# Patient Record
Sex: MALE | Race: WHITE | Employment: UNEMPLOYED | URBAN - METROPOLITAN AREA
[De-identification: names, ages, dates, MRNs, and addresses within clinical notes are randomized per-mention and may not be internally consistent; named-entity substitution may affect disease eponyms.]

---

## 2017-04-27 ENCOUNTER — OP HISTORICAL/CONVERTED ENCOUNTER (OUTPATIENT)
Dept: OTHER | Age: 42
End: 2017-04-27

## 2017-12-02 ENCOUNTER — ED HISTORICAL/CONVERTED ENCOUNTER (OUTPATIENT)
Dept: OTHER | Age: 42
End: 2017-12-02

## 2018-09-19 ENCOUNTER — HOSPITAL ENCOUNTER (OUTPATIENT)
Dept: INTERVENTIONAL RADIOLOGY/VASCULAR | Age: 43
Discharge: HOME | End: 2018-09-19
Attending: ORTHOPAEDIC SURGERY | Admitting: ORTHOPAEDIC SURGERY
Payer: COMMERCIAL

## 2018-09-19 VITALS
HEIGHT: 74 IN | DIASTOLIC BLOOD PRESSURE: 64 MMHG | HEART RATE: 65 BPM | WEIGHT: 263 LBS | RESPIRATION RATE: 16 BRPM | TEMPERATURE: 98.1 F | SYSTOLIC BLOOD PRESSURE: 122 MMHG | OXYGEN SATURATION: 95 % | BODY MASS INDEX: 33.75 KG/M2

## 2018-09-19 DIAGNOSIS — M48.061 STENOSIS, SPINAL, LUMBAR: ICD-10-CM

## 2018-09-19 DIAGNOSIS — Z98.1 S/P SPINAL FUSION: ICD-10-CM

## 2018-09-19 DIAGNOSIS — M54.50 LOW BACK PAIN: ICD-10-CM

## 2018-09-19 PROCEDURE — 64483 NJX AA&/STRD TFRM EPI L/S 1: CPT

## 2018-09-19 PROCEDURE — 74011250636 HC RX REV CODE- 250/636: Performed by: STUDENT IN AN ORGANIZED HEALTH CARE EDUCATION/TRAINING PROGRAM

## 2018-09-19 PROCEDURE — 77030003666 HC NDL SPINAL BD -A

## 2018-09-19 PROCEDURE — 74011636320 HC RX REV CODE- 636/320: Performed by: STUDENT IN AN ORGANIZED HEALTH CARE EDUCATION/TRAINING PROGRAM

## 2018-09-19 RX ORDER — TRAMADOL HYDROCHLORIDE 50 MG/1
50 TABLET ORAL
COMMUNITY
End: 2020-10-21

## 2018-09-19 RX ORDER — DEXAMETHASONE SODIUM PHOSPHATE 4 MG/ML
8 INJECTION, SOLUTION INTRA-ARTICULAR; INTRALESIONAL; INTRAMUSCULAR; INTRAVENOUS; SOFT TISSUE ONCE
Status: COMPLETED | OUTPATIENT
Start: 2018-09-19 | End: 2018-09-19

## 2018-09-19 RX ORDER — CYCLOBENZAPRINE HCL 10 MG
10 TABLET ORAL
COMMUNITY
End: 2020-10-21

## 2018-09-19 RX ORDER — LIDOCAINE HYDROCHLORIDE 10 MG/ML
10 INJECTION, SOLUTION EPIDURAL; INFILTRATION; INTRACAUDAL; PERINEURAL
Status: COMPLETED | OUTPATIENT
Start: 2018-09-19 | End: 2018-09-19

## 2018-09-19 RX ADMIN — IOHEXOL 8 ML: 180 INJECTION INTRAVENOUS at 14:13

## 2018-09-19 RX ADMIN — DEXAMETHASONE SODIUM PHOSPHATE 8 MG: 4 INJECTION, SOLUTION INTRAMUSCULAR; INTRAVENOUS at 14:13

## 2018-09-19 RX ADMIN — LIDOCAINE HYDROCHLORIDE 8 ML: 10 INJECTION, SOLUTION EPIDURAL; INFILTRATION; INTRACAUDAL; PERINEURAL at 14:12

## 2018-09-19 NOTE — DISCHARGE INSTRUCTIONS
Steroid Injection Discharge Instructions    General Information:   A steroid injection was performed today, placing a combination of a steroid and an anesthetic (numbing medicine) into the space around the nerves of your spine. This is done to treat back pain. It may take 7-10 days for the injection to reach its full potential.  This procedure can be done at any level of the spinal column, depending on where your pain is. Your doctor will have ordered the appropriate level to be treated prior to your coming in for the procedure. Home Care Instructions: You can resume your regular diet. Do not drink alcohol today. You may notice that you have to use your pain medications less after your injection. Some people do not notice much of a change in their pain after the first injection. If that is the case, it is worth your time to have a second one done. This is why these injections are sometimes ordered in a series of three. Keep the puncture site clean and dry for 24 hours, and then you may remove the dressing. Showering is acceptable after the bandage is removed. Rest today be aware there maybe numbness or tingling that may last up to 12 hours after the inject. Call If:   You should call your Physician and/or the Radiology Nurse if you have any bleeding other than a small spot on your bandage. Call if you have any signs of infection, fever, increased pain at the puncture site, confusion, or a headache that worsens when you stand and eases when lying flat. Follow-Up Instructions:  Please see your ordering doctor as he/she has requested. Let your doctor know if you have relief from your pain so they may schedule another injection for you if it is indicated. To Reach Us:  Should you experience any significant changes, please call [phone] between the hours of 7:30 am and 10 pm or [phone] after hours.  After hours, ask the  to page the X-ray Technologist, and describe the problem to the technologist.           Date: 9/19/2018  Discharging Nurse:  Matias Chau RN

## 2018-10-29 ENCOUNTER — OP HISTORICAL/CONVERTED ENCOUNTER (OUTPATIENT)
Dept: OTHER | Age: 43
End: 2018-10-29

## 2018-11-14 ENCOUNTER — OP HISTORICAL/CONVERTED ENCOUNTER (OUTPATIENT)
Dept: OTHER | Age: 43
End: 2018-11-14

## 2019-01-18 ENCOUNTER — HOSPITAL ENCOUNTER (OUTPATIENT)
Dept: INTERVENTIONAL RADIOLOGY/VASCULAR | Age: 44
Discharge: HOME | End: 2019-01-18
Attending: ORTHOPAEDIC SURGERY | Admitting: RADIOLOGY
Payer: COMMERCIAL

## 2019-01-18 DIAGNOSIS — M54.50 LOW BACK PAIN: ICD-10-CM

## 2019-01-18 DIAGNOSIS — Z98.1 H/O SPINAL FUSION: ICD-10-CM

## 2019-01-18 DIAGNOSIS — M48.061 LUMBAR STENOSIS: ICD-10-CM

## 2019-01-18 PROCEDURE — 74011636320 HC RX REV CODE- 636/320: Performed by: RADIOLOGY

## 2019-01-18 PROCEDURE — 64483 NJX AA&/STRD TFRM EPI L/S 1: CPT

## 2019-01-18 PROCEDURE — 74011250636 HC RX REV CODE- 250/636: Performed by: RADIOLOGY

## 2019-01-18 RX ORDER — LIDOCAINE HYDROCHLORIDE 10 MG/ML
5 INJECTION, SOLUTION EPIDURAL; INFILTRATION; INTRACAUDAL; PERINEURAL ONCE
Status: COMPLETED | OUTPATIENT
Start: 2019-01-18 | End: 2019-01-18

## 2019-01-18 RX ORDER — DEXAMETHASONE SODIUM PHOSPHATE 10 MG/ML
10 INJECTION INTRAMUSCULAR; INTRAVENOUS ONCE
Status: COMPLETED | OUTPATIENT
Start: 2019-01-18 | End: 2019-01-18

## 2019-01-18 RX ADMIN — IOHEXOL 10 ML: 180 INJECTION INTRAVENOUS at 08:39

## 2019-01-18 RX ADMIN — DEXAMETHASONE SODIUM PHOSPHATE 10 MG: 10 INJECTION, SOLUTION INTRAMUSCULAR; INTRAVENOUS at 08:39

## 2019-01-18 RX ADMIN — LIDOCAINE HYDROCHLORIDE 5 ML: 10 INJECTION, SOLUTION EPIDURAL; INFILTRATION; INTRACAUDAL; PERINEURAL at 08:40

## 2019-01-18 NOTE — PROGRESS NOTES
Pt discharged via wheelchair escorted by wife, discharge instructions given both verbalize understanding

## 2019-01-18 NOTE — DISCHARGE INSTRUCTIONS
Steroid Injection Discharge Instructions    General Information:   A steroid injection was performed today, placing a combination of a steroid and an anesthetic (numbing medicine) into the space around the nerves of your spine. This is done to treat back pain. It may take 7-10 days for the injection to reach its full potential.  This procedure can be done at any level of the spinal column, depending on where your pain is. Your doctor will have ordered the appropriate level to be treated prior to your coming in for the procedure. Home Care Instructions: You can resume your regular diet. Do not drink alcohol. You may notice that you have to use your pain medications less after your injection. Some people do not notice much of a change in their pain after the                     first injection. If that is the case, it is worth your time to have a second one done. This is why these injections are sometimes ordered in a series of                    three. Keep the puncture site clean and dry for 24 hours, and then you may remove the dressing. Showering is acceptable after the bandage is removed. Avoid tub baths, swimming, and hot tubs until puncture site is healed. Call If:   You should call your Physician and/or the Radiology Nurse if you have any bleeding other than a small spot on your bandage. Call if you have any signs of infection, fever, increased pain at the puncture site, confusion, or a headache that worsens when you stand and eases when lying flat. Follow-Up Instructions:  Please see your ordering doctor as he/she has requested. Let your doctor know if you have relief from your pain so they may schedule another injection for you if it is indicated. To Reach Us:    Should you experience any significant changes, please call [phone] between the hours of 730 am and 10 pm or [phone] after hours.   After hours, ask the  to page the [address] Technologist, and describe the problem to the technologist.

## 2019-06-16 ENCOUNTER — ED HISTORICAL/CONVERTED ENCOUNTER (OUTPATIENT)
Dept: OTHER | Age: 44
End: 2019-06-16

## 2019-08-07 ENCOUNTER — OP HISTORICAL/CONVERTED ENCOUNTER (OUTPATIENT)
Dept: OTHER | Age: 44
End: 2019-08-07

## 2019-08-22 ENCOUNTER — OP HISTORICAL/CONVERTED ENCOUNTER (OUTPATIENT)
Dept: OTHER | Age: 44
End: 2019-08-22

## 2019-08-28 ENCOUNTER — OP HISTORICAL/CONVERTED ENCOUNTER (OUTPATIENT)
Dept: OTHER | Age: 44
End: 2019-08-28

## 2020-10-21 ENCOUNTER — HOSPITAL ENCOUNTER (EMERGENCY)
Age: 45
Discharge: HOME | End: 2020-10-21
Attending: FAMILY MEDICINE
Payer: COMMERCIAL

## 2020-10-21 VITALS
TEMPERATURE: 98.5 F | OXYGEN SATURATION: 97 % | SYSTOLIC BLOOD PRESSURE: 111 MMHG | WEIGHT: 275 LBS | RESPIRATION RATE: 16 BRPM | BODY MASS INDEX: 34.19 KG/M2 | DIASTOLIC BLOOD PRESSURE: 70 MMHG | HEIGHT: 75 IN | HEART RATE: 69 BPM

## 2020-10-21 DIAGNOSIS — S21.111A LACERATION OF CHEST WALL, RIGHT, INITIAL ENCOUNTER: Primary | ICD-10-CM

## 2020-10-21 PROCEDURE — 96372 THER/PROPH/DIAG INJ SC/IM: CPT

## 2020-10-21 PROCEDURE — 74011000250 HC RX REV CODE- 250: Performed by: FAMILY MEDICINE

## 2020-10-21 PROCEDURE — 90715 TDAP VACCINE 7 YRS/> IM: CPT | Performed by: FAMILY MEDICINE

## 2020-10-21 PROCEDURE — 90471 IMMUNIZATION ADMIN: CPT

## 2020-10-21 PROCEDURE — 75810000293 HC SIMP/SUPERF WND  RPR

## 2020-10-21 PROCEDURE — 99282 EMERGENCY DEPT VISIT SF MDM: CPT

## 2020-10-21 PROCEDURE — 74011250636 HC RX REV CODE- 250/636: Performed by: FAMILY MEDICINE

## 2020-10-21 RX ORDER — LIDOCAINE HYDROCHLORIDE AND EPINEPHRINE 10; 10 MG/ML; UG/ML
10 INJECTION, SOLUTION INFILTRATION; PERINEURAL ONCE
Status: COMPLETED | OUTPATIENT
Start: 2020-10-21 | End: 2020-10-21

## 2020-10-21 RX ADMIN — LIDOCAINE HYDROCHLORIDE AND EPINEPHRINE 100 MG: 10; 10 INJECTION, SOLUTION INFILTRATION; PERINEURAL at 12:32

## 2020-10-21 RX ADMIN — TETANUS TOXOID, REDUCED DIPHTHERIA TOXOID AND ACELLULAR PERTUSSIS VACCINE, ADSORBED 0.5 ML: 5; 2.5; 8; 8; 2.5 SUSPENSION INTRAMUSCULAR at 12:30

## 2020-10-21 NOTE — ED TRIAGE NOTES
Pt was cutting pipe and saw kicked up and hit him in upper right chest.  2 cm laceration noted. Bleeding controlled. Needs tetanes vaccine.

## 2020-10-21 NOTE — ED PROVIDER NOTES
[facility] EMERGENCY CARE CENTER    HISTORY AND PHYSICAL EXAM      Date: 10/21/2020  Patient Name: Agnes Lowery  Room:  D01/01    History of Presenting Illness     Chief Complaint:  Laceration       History Provided By: Patient  HPI/ROS Limits: None    HPI: Елена MerrittTammy Rebolledo is a 41 y.o. male presenting to the ED with CC of Laceration   with initial onset just PTA. Patient states he was sawing a pipe with a pipe saw at home when he lost control and it hit his upper right chest.  Patient complaining of dull achy pain and rates it as mild to moderate. Patient states he controlled the bleeding at home and is concerned about possible foreign body secondary to the nature of the pipe saw. Currently, patient denies severe pain, paralysis, or paresthesias. There are no other complaints, changes, or physical findings at this time. PCP: Kavya Ashford MD    No current facility-administered medications on file prior to encounter. Current Outpatient Medications on File Prior to Encounter   Medication Sig Dispense Refill    [DISCONTINUED] traMADol (ULTRAM) 50 mg tablet Take 50 mg by mouth every six (6) hours as needed for Pain.  [DISCONTINUED] cyclobenzaprine (FLEXERIL) 10 mg tablet Take 10 mg by mouth three (3) times daily as needed for Muscle Spasm(s).  rivaroxaban (XARELTO) 20 mg tab tablet Take 1 Tab by mouth daily. 14 Tab 0    oxyCODONE IR (OXY-IR) 15 mg immediate release tablet Take 1 Tab by mouth every four (4) hours as needed for Pain. Max Daily Amount: 90 mg. 60 Tab 0    [DISCONTINUED] diazepam (VALIUM) 5 mg tablet Take 1 Tab by mouth every six (6) hours as needed (Muscle Spasms). Max Daily Amount: 20 mg. 40 Tab 0    folic acid (FOLVITE) 1 mg tablet Take 1 mg by mouth daily.  [DISCONTINUED] citalopram (CELEXA) 40 mg tablet Take 40 mg by mouth every morning.  [DISCONTINUED] gabapentin (NEURONTIN) 800 mg tablet Take 800 mg by mouth three (3) times daily.       [DISCONTINUED] busPIRone (BUSPAR) 10 mg tablet Take 10 mg by mouth three (3) times daily. Past History     PAST MEDICAL  The patient  has a past medical history of Chronic pain, Coagulation disorder ([address] Ill-defined condition, Lumbar stenosis, and Thromboembolus ([facility] [address] (2014). PAST SURGICAL  The patient  has a past surgical history that includes ir inj foramin epid lumb anes/ster sngl (1/18/2019). SOCIAL HISTORY  The patient  reports that he has been smoking. He has never used smokeless tobacco. He reports that he does not drink alcohol. Allergies: The patient is allergic to ancef [cefazolin]; bee sting [sting, bee]; and penicillins. Review of Systems     Review of Systems   Constitutional: Negative for chills and fever. HENT: Negative. Eyes: Negative for visual disturbance. Respiratory: Negative for cough and shortness of breath. Cardiovascular: Negative for chest pain. Gastrointestinal: Negative for abdominal pain, diarrhea, nausea and vomiting. Endocrine: Negative. Genitourinary: Negative for discharge, dysuria and penile pain. Musculoskeletal: Negative. Skin: Positive for wound. Allergic/Immunologic: Negative. Neurological: Negative. Hematological: Negative. Psychiatric/Behavioral: Negative. Physical Exam     Vital Signs-Reviewed the patient's vital signs. Patient Vitals for the past 24 hrs:   Temp Pulse Resp BP SpO2   10/21/20 1049 98.5 °F (36.9 °C) 69 16 111/70 97 %      Physical Exam  Vitals signs and nursing note reviewed. Constitutional:       Appearance: Normal appearance. HENT:      Head: Normocephalic and atraumatic. Nose: Nose normal.      Mouth/Throat:      Lips: Pink. Mouth: Mucous membranes are moist.   Eyes:      Extraocular Movements: Extraocular movements intact. Conjunctiva/sclera: Conjunctivae normal.      Pupils: Pupils are equal, round, and reactive to light.    Neck:      Musculoskeletal: Normal range of motion and neck supple. Cardiovascular:      Pulses: Normal pulses. Pulmonary:      Effort: Pulmonary effort is normal. No respiratory distress. Musculoskeletal: Normal range of motion. Skin:     General: Skin is warm and dry. Neurological:      General: No focal deficit present. Mental Status: He is alert and oriented to person, place, and time. Psychiatric:         Mood and Affect: Mood normal.         Behavior: Behavior normal.       Diagnostic Study Results     Labs -   No results found for this or any previous visit (from the past 12 hour(s)). Radiologic Studies -   No orders to display     CT Results  (Last 48 hours)    None        CXR Results  (Last 48 hours)    None          Procedures/Critical Care     PROCEDURES    LACERATION REPAIR   By Memo Garduno MD    Location: Right upper chest  Length: 3.5 x 1 cm  Layers: single}  Contamination: mildly contaminated minimal debris  Suture(s):  3-0 ethilon 3 H-mat    Verbal consent. Patient identified and location of wound verified. Tetanus given. Appropriate sterile precautions observed with saline, drape, & gloves  Lidocaine 1% with epi given locally. Wound and surrounding structures checked and no further trauma found. Patient tolerated procedure well with no apparent complications. Medical Decision Making   I am the first provider for this patient. I reviewed the vital signs, available nursing notes, past medical history, past surgical history, family history and social history. Records Reviewed: Nursing Notes    ED Course:   Initial assessment performed. The patients presenting problems have been discussed, and they are in agreement with the care plan formulated and outlined with them. I have encouraged them to ask questions as they arise throughout their visit.          Medications   diph,Pertuss(AC),Tet Vac-PF (BOOSTRIX) suspension 0.5 mL (0.5 mL IntraMUSCular Given 10/21/20 1230)   lidocaine-EPINEPHrine (XYLOCAINE) 1 %-1:100,000 injection 100 mg (100 mg SubCUTAneous Given 10/21/20 1232)       Provider Notes (Medical Decision Making):   Patient presents to the ED with laceration from a pipe saw. Evaluation of the patient revealed laceration site wiring repair. Patient advised to return the ED for suture removal in 7 to 10 days. The patient has received maximum benefit from this visit and felt eligible for discharge. All questions were answered and concerns addressed. The patient was advised to follow up with PCP and/or return to the emergency department if condition worsens. The patient was discharged in stable condition. Diagnosis/Plan/Follow Up     CLINICAL IMPRESSION:      ICD-10-CM ICD-9-CM   1. Laceration of chest wall, right, initial encounter  S21.111A 875.0        DISPOSITION:  Discharged to Home/Self Care in stable condition. PLAN/FOLLOW UP  1. Current Discharge Medication List        2. The patients results have been reviewed with them. The patient has been counseled regarding their diagnosis. The patient verbally conveys understanding and agreement of the signs, symptoms, diagnosis, treatment and prognosis and additionally agrees to follow up as recommended with their PCP. The patient also agrees with the care-plan and conveys that all of their questions have been answered. I have also put together some discharge instructions for them that include: 1) educational information regarding their diagnosis, 2) how to care for their diagnosis at home, as well as a 3) list of reasons why they would want to return to the ED prior to their follow-up appointment, should their condition change.     Follow-up Information     Follow up With Specialties Details Why Contact Info    Jer Byers MD Internal Medicine Schedule an appointment as soon as possible for a visit  As needed [address] [place] [address]       [address] [facility] Emergency Medicine Go in 7 days For suture removal [address] [place] [address] [phone]        Return to ED if worse       ERNESTO Jimenez M.D.   [facility]  Emergency Department Physician

## 2020-10-21 NOTE — LETTER
[address] [place] [address] 
[phone] Work/School Note Date: 10/21/2020 To Whom It May concern: 
 
Karthik Coleman. Joel Holley was seen and treated today in the emergency room by the following provider(s): 
Attending Provider: Valente Duval MD. Karthik Cloeman. Joel Holley may return to work on October 23, 2020. Sincerely, 
 
 
Dr. Hardy Fish.  Halie Brower

## 2022-01-25 ENCOUNTER — OFFICE VISIT (OUTPATIENT)
Dept: ORTHOPEDIC SURGERY | Age: 47
End: 2022-01-25
Payer: COMMERCIAL

## 2022-01-25 VITALS — BODY MASS INDEX: 33.57 KG/M2 | WEIGHT: 270 LBS | HEIGHT: 75 IN

## 2022-01-25 DIAGNOSIS — M47.816 LUMBAR SPONDYLOSIS: ICD-10-CM

## 2022-01-25 DIAGNOSIS — M54.16 LUMBAR RADICULOPATHY: ICD-10-CM

## 2022-01-25 DIAGNOSIS — Z98.1 HISTORY OF LUMBAR SPINAL FUSION: ICD-10-CM

## 2022-01-25 DIAGNOSIS — M50.30 DDD (DEGENERATIVE DISC DISEASE), CERVICAL: ICD-10-CM

## 2022-01-25 DIAGNOSIS — M48.062 SPINAL STENOSIS OF LUMBAR REGION WITH NEUROGENIC CLAUDICATION: ICD-10-CM

## 2022-01-25 DIAGNOSIS — M54.50 LUMBAR BACK PAIN: Primary | ICD-10-CM

## 2022-01-25 PROCEDURE — 99214 OFFICE O/P EST MOD 30 MIN: CPT | Performed by: PHYSICIAN ASSISTANT

## 2022-01-25 RX ORDER — HYDROCODONE BITARTRATE AND ACETAMINOPHEN 5; 325 MG/1; MG/1
1 TABLET ORAL
Qty: 30 TABLET | Refills: 0 | Status: SHIPPED | OUTPATIENT
Start: 2022-01-25 | End: 2022-01-25 | Stop reason: ALTCHOICE

## 2022-01-25 RX ORDER — HYDROCODONE BITARTRATE AND ACETAMINOPHEN 5; 325 MG/1; MG/1
1 TABLET ORAL
Qty: 30 TABLET | Refills: 0 | Status: SHIPPED | OUTPATIENT
Start: 2022-01-25 | End: 2022-02-01

## 2022-01-25 RX ORDER — GABAPENTIN 400 MG/1
400 CAPSULE ORAL 3 TIMES DAILY
COMMUNITY
Start: 2021-12-30

## 2022-01-25 RX ORDER — METHOCARBAMOL 500 MG/1
TABLET, FILM COATED ORAL
COMMUNITY
Start: 2021-12-30

## 2022-01-25 NOTE — PROGRESS NOTES
Georgie Henderson (: 1975) is a 40 y.o. male, established patient, here for evaluation of the following chief complaint(s):  Back Pain       SUBJECTIVE/OBJECTIVE:  Georgie Henderson (: 1975) is a 40 y.o. male. Lumbar Laminectomy L4-s1, Posterior Spinal Fusion L5-s1 2016     Patient presents for evaluation of lumbar back pain with radiation to the right lower extremity which has been present for the past 2-3 years patient has history of lumbar laminectomy and fusion L5-S1 performed with Dr. Rita Resendiz 2016. Patient states initially did well following surgery however upon returning to work as a  he states he began developing pain in the lower lumbar region with radiation to the right buttock, thigh, groin and lower leg patient has been taking gabapentin 1200 mg daily, using heat and Tylenol as needed for pain relief and rates his average daily pain level as a 9/10. Patient states that by the end of the day he has difficulty even standing upright and walking. Patient describes right leg pain cramping in the thigh and buttock patient describes weakness in the right leg. Patient has a history of DVT/PE and takes Xarelto chronically. Patient states he has progressed to the point that he can no longer continue to function normally experiencing severe pain. The patient denies bowel/bladder incontinence    Allergies   Allergen Reactions    Ancef [Cefazolin] Anaphylaxis    Bee Sting [Sting, Bee] Anaphylaxis    Penicillins Anaphylaxis       Current Outpatient Medications   Medication Sig    HYDROcodone-acetaminophen (NORCO) 5-325 mg per tablet Take 1 Tablet by mouth every six (6) hours as needed for Pain for up to 7 days. Max Daily Amount: 4 Tablets.  gabapentin (NEURONTIN) 400 mg capsule Take 400 mg by mouth three (3) times daily.     methocarbamoL (ROBAXIN) 500 mg tablet take 1 tablet by mouth twice a day if needed for muscle spasm    rivaroxaban (XARELTO) 20 mg tab tablet Take 1 Tab by mouth daily.  folic acid (FOLVITE) 1 mg tablet Take 1 mg by mouth daily. No current facility-administered medications for this visit. Social History     Socioeconomic History    Marital status:      Spouse name: Not on file    Number of children: Not on file    Years of education: Not on file    Highest education level: Not on file   Occupational History    Not on file   Tobacco Use    Smoking status: Current Some Day Smoker    Smokeless tobacco: Never Used   Substance and Sexual Activity    Alcohol use: No    Drug use: Not on file    Sexual activity: Yes     Partners: Female   Other Topics Concern    Not on file   Social History Narrative    Not on file     Social Determinants of Health     Financial Resource Strain:     Difficulty of Paying Living Expenses: Not on file   Food Insecurity:     Worried About Running Out of Food in the Last Year: Not on file    King of Food in the Last Year: Not on file   Transportation Needs:     Lack of Transportation (Medical): Not on file    Lack of Transportation (Non-Medical):  Not on file   Physical Activity:     Days of Exercise per Week: Not on file    Minutes of Exercise per Session: Not on file   Stress:     Feeling of Stress : Not on file   Social Connections:     Frequency of Communication with Friends and Family: Not on file    Frequency of Social Gatherings with Friends and Family: Not on file    Attends Denominational Services: Not on file    Active Member of Clubs or Organizations: Not on file    Attends Club or Organization Meetings: Not on file    Marital Status: Not on file   Intimate Partner Violence:     Fear of Current or Ex-Partner: Not on file    Emotionally Abused: Not on file    Physically Abused: Not on file    Sexually Abused: Not on file   Housing Stability:     Unable to Pay for Housing in the Last Year: Not on file    Number of [place] in the Last Year: Not on file    Unstable Housing in the Last Year: Not on file       Past Surgical History:   Procedure Laterality Date    IR INJ [facility] EPID LUMB PURA/[facility]  1/18/2019       Family History   Problem Relation Age of Onset    Seizures Mother     Cancer Mother         LUNG    Diabetes Father     Seizures Brother     Depression Brother                REVIEW OF SYSTEMS:  ROS    Patient denies any recent fever, chills, nausea, vomiting, chest pain, or shortness of breath. Vitals:  Ht 6' 3\" (1.905 m)   Wt 122.5 kg (270 lb)   BMI 33.75 kg/m²    Body mass index is 33.75 kg/m². PHYSICAL EXAM:  Physical Exam     The patient is alert and oriented x3 and in no acute distress. Patient ambulates with a normal gait without gait aids. Sensory testing in all major nerve distributions in the lower extremities is intact and symmetric with the exception of left medial ankle foot, and calf which are diminished versus the right. . Manual motor testing of the major muscle groups of the lower extremities including dorsiflexion/EHL/plantarflexion, knee flexion/extension, hip flexion/extension/abduction/adduction is intact in the left lower extremity manual motor testing of the major muscle groups of the right lower extremity demonstrate right knee flexion/extension weakness 3/5 and right hip flexion/abduction weakness 3/5. Deep tendon reflexes flat and symmetric in the bilateral knees and ankles. Negative straight leg raise on the left and is considered positive on the right. No evidence of clonus bilaterally. Babinski's downgoing and symmetric bilaterally. Distal pulses 2+ and symmetric bilaterally. Patient was noted to have pain with range of motion of the right hip with flexion to 100 degrees, 20 degrees internal rotation and 30 degrees external rotation for range of motion of the left hip. There is no tenderness to palpation of the thoracic, lumbar or sacral regions.     IMAGING:    AP, lateral, flexion and extension digital view radiographs of the lumbar spine obtained in the office today and was reviewed demonstrate good alignment posterior instrumentation L5-S1 which appears to be well fused without evidence of hardware loosening or subsidence. There is a 20 degree dextroscoliosis deformity with the apex of curvature at the L2 vertebral level. The adjacent segment above the fusion level L4-5 demonstrates significant disc height loss with evidence of spondylosis with foraminal encroachment. At L3-L4 there is grade 1 retrolisthesis of 5 mm which is mobile on flexion/extension views. There is no evidence of fracture, lytic lesion or acute bony abnormality. Orders Placed This Encounter    XR SPINE LUMB MIN 4 V     2A     Standing Status:   Future     Number of Occurrences:   1     Standing Expiration Date:   1/26/2023    XR PELV 1 OR 2 V     2A     Standing Status:   Future     Number of Occurrences:   1     Standing Expiration Date:   1/25/2023    DISCONTD: HYDROcodone-acetaminophen (NORCO) 5-325 mg per tablet     Sig: Take 1 Tablet by mouth every six (6) hours as needed for Pain for up to 7 days. Max Daily Amount: 4 Tablets. Dispense:  30 Tablet     Refill:  0    DISCONTD: HYDROcodone-acetaminophen (NORCO) 5-325 mg per tablet     Sig: Take 1 Tablet by mouth every six (6) hours as needed for Pain for up to 8 days. Max Daily Amount: 4 Tablets. Indications: pain     Dispense:  30 Tablet     Refill:  0    HYDROcodone-acetaminophen (NORCO) 5-325 mg per tablet     Sig: Take 1 Tablet by mouth every six (6) hours as needed for Pain for up to 7 days. Max Daily Amount: 4 Tablets. Dispense:  30 Tablet     Refill:  0          ASSESSMENT/PLAN:      1. Lumbar back pain  -     XR SPINE LUMB MIN 4 V; Future  -     XR PELV 1 OR 2 V; Future  -     HYDROcodone-acetaminophen (NORCO) 5-325 mg per tablet; Take 1 Tablet by mouth every six (6) hours as needed for Pain for up to 7 days.  Max Daily Amount: 4 Tablets., Normal, Disp-30 Tablet, R-0  2. History of lumbar spinal fusion  3. Lumbar spondylosis  4. DDD (degenerative disc disease), cervical  5. Lumbar radiculopathy  6. Spinal stenosis of lumbar region with neurogenic claudication        Below is the assessment and plan developed based on review of pertinent history, physical exam, labs, studies, and medications. Have discussed the diagnosis and radiographic findings at length and answered all patient questions to his questions to their satisfaction. Given the patient's ongoing pain, motor and sensory deficits despite conservative management have referred the patient for MRI lumbar to assess for severe stenosis. We will plan on seeing the patient back for reevaluation and further treatment recommendations once imaging results are available for review. The patient understands and agrees to the treatment plan as outlined above. No follow-ups on file. Dr. Sabine Sandoval was available for immediate consultation as needed. An electronic signature was used to authenticate this note.   -- Bassam Morse PA-C

## 2022-02-28 DIAGNOSIS — M54.16 ACUTE LUMBAR RADICULOPATHY: Primary | ICD-10-CM

## 2022-02-28 DIAGNOSIS — M48.062 SPINAL STENOSIS OF LUMBAR REGION WITH NEUROGENIC CLAUDICATION: ICD-10-CM

## 2022-02-28 DIAGNOSIS — M54.16 ACUTE LUMBAR RADICULOPATHY: ICD-10-CM

## 2022-02-28 RX ORDER — HYDROCODONE BITARTRATE AND ACETAMINOPHEN 5; 325 MG/1; MG/1
1 TABLET ORAL
Qty: 30 TABLET | Refills: 0 | OUTPATIENT
Start: 2022-02-28 | End: 2022-02-28 | Stop reason: SDUPTHER

## 2022-02-28 RX ORDER — HYDROCODONE BITARTRATE AND ACETAMINOPHEN 5; 325 MG/1; MG/1
1 TABLET ORAL
Qty: 30 TABLET | Refills: 0 | Status: SHIPPED | OUTPATIENT
Start: 2022-02-28 | End: 2022-03-07

## 2022-02-28 NOTE — TELEPHONE ENCOUNTER
Patient calling about next step and refill on pain meds. States he was supposed to have a pain mgmt referral for injections? ?    Please clarify.     [phone]

## 2022-03-07 DIAGNOSIS — M54.16 ACUTE LUMBAR RADICULOPATHY: ICD-10-CM

## 2022-03-07 DIAGNOSIS — M48.062 SPINAL STENOSIS OF LUMBAR REGION WITH NEUROGENIC CLAUDICATION: ICD-10-CM

## 2022-03-28 DIAGNOSIS — M48.062 SPINAL STENOSIS OF LUMBAR REGION WITH NEUROGENIC CLAUDICATION: Primary | ICD-10-CM

## 2022-03-28 RX ORDER — HYDROCODONE BITARTRATE AND ACETAMINOPHEN 5; 325 MG/1; MG/1
1 TABLET ORAL
Qty: 15 TABLET | Refills: 0 | Status: SHIPPED | OUTPATIENT
Start: 2022-03-28 | End: 2022-04-01

## 2022-03-28 NOTE — TELEPHONE ENCOUNTER
Patients wife called requesting a refill on Hydrocodone/Acetameniphin 5/325    Wife also reported no one had called to schedule for MRI that was ordered 2/28/22, I have Eli the number to [address] so that she can call and have MRI scheduled

## 2022-04-04 DIAGNOSIS — M48.062 SPINAL STENOSIS OF LUMBAR REGION WITH NEUROGENIC CLAUDICATION: ICD-10-CM

## 2022-04-04 DIAGNOSIS — M54.16 ACUTE LUMBAR RADICULOPATHY: ICD-10-CM

## 2022-04-08 ENCOUNTER — HOSPITAL ENCOUNTER (OUTPATIENT)
Dept: MRI IMAGING | Age: 47
Discharge: HOME | End: 2022-04-08
Payer: COMMERCIAL

## 2022-04-08 PROCEDURE — 72148 MRI LUMBAR SPINE W/O DYE: CPT

## 2022-04-28 ENCOUNTER — OFFICE VISIT (OUTPATIENT)
Dept: ORTHOPEDIC SURGERY | Age: 47
End: 2022-04-28
Payer: COMMERCIAL

## 2022-04-28 VITALS — WEIGHT: 270 LBS | HEIGHT: 75 IN | BODY MASS INDEX: 33.57 KG/M2

## 2022-04-28 DIAGNOSIS — Z98.1 HISTORY OF LUMBAR SPINAL FUSION: ICD-10-CM

## 2022-04-28 DIAGNOSIS — M51.26 PROTRUSION OF LUMBAR INTERVERTEBRAL DISC: ICD-10-CM

## 2022-04-28 DIAGNOSIS — M48.062 SPINAL STENOSIS OF LUMBAR REGION WITH NEUROGENIC CLAUDICATION: ICD-10-CM

## 2022-04-28 DIAGNOSIS — M54.50 LUMBAR BACK PAIN: Primary | ICD-10-CM

## 2022-04-28 DIAGNOSIS — M54.16 LUMBAR RADICULOPATHY: ICD-10-CM

## 2022-04-28 PROCEDURE — 99215 OFFICE O/P EST HI 40 MIN: CPT | Performed by: PHYSICIAN ASSISTANT

## 2022-04-28 RX ORDER — HYDROCODONE BITARTRATE AND ACETAMINOPHEN 5; 325 MG/1; MG/1
1 TABLET ORAL
Qty: 30 TABLET | Refills: 0 | Status: SHIPPED | OUTPATIENT
Start: 2022-04-28 | End: 2022-05-05

## 2022-04-28 NOTE — PROGRESS NOTES
Virginia NievesTammy Brown (: 1975) is a 40 y.o. male, established  patient, here for evaluation of the following chief complaint(s):  Back Pain and Leg Pain         SUBJECTIVE/OBJECTIVE:    Virginia Brown (: 1975) is a 40 y.o. male who presents for evaluation of lumbar back pain with radiation to the right lower extremity. Symptoms have been present for the past 2-3 years. The patient has a history of lumbar laminectomy and fusion L5-S1 performed with Dr. Richard Martin 2016. Patient states initially did well following that procedure. Patient states that recovering from the surgery returning to work as a  he began developing pain in the lower lumbar region with radiation to the right buttock, posterior lateral thigh and lower leg. The patient has been taking gabapentin 1200 mg daily, using heat and Tylenol as needed for pain relief and rates his average daily pain level as a 9/10. The patient states that by the end of the day he is difficulty standing upright and walking even short distances. Patient describes right leg cramping and weakness in the right leg. Patient has a history of DVT/PE and takes Xarelto chronically. Patient states even performing activities of daily living are difficult for him secondary to severe pain. On the basis of his last evaluation given failure to improve with conservative management increase occluding steroids, muscle relaxers, pain medication and several weeks of outpatient physical therapy the patient was referred for MRI and returns today for review. The patient denies saddle paraesthesias/ anaesthesia. Pain Assessment  2022   Location of Pain Back;Leg   Severity of Pain 9          ROS    The patient denies fevers, chills, chest pain, shortness of breath, nausea, vomiting. Positive for musculoskeletal issues as described in the HPI.       Vitals:  Ht 6' 3\" (1.905 m)   Wt 270 lb (122.5 kg)   BMI 33.75 kg/m²    Body mass index is 33.75 kg/m². PHYSICAL EXAM:    The patient is alert and oriented x3 and in no acute distress. Patient ambulates with a normal gait without gait aids. Sensory testing in all major nerve distributions in the lower extremities is intact and symmetric with the exception of the right medial foot and medial calf which are diminished versus the left. . Manual motor testing of the major muscle groups of the lower extremities including dorsiflexion/EHL/ plantarflexion, knee flexion/extension, hip flexion/extension/abduction/ adduction is intact and symmetric in the bilateral lower extremities with the exception of right dorsiflexion/eversion which is weak and graded 3/5, right knee flexion/extension is weak and graded 3/5, right hip flexion is weak and graded 4/5. Deep tendon reflexes flat and symmetric in the bilateral knees and ankles. Hip range of motion elicits low back pain. Negative straight leg raise bilaterally. No evidence of clonus bilaterally. Babinski's downgoing and symmetric bilaterally. Distal pulses intact and symmetric bilaterally. There is no tenderness to palpation of the thoracic, lumbar or sacral regions. IMAGING:    XR Results (most recent):  Results from Appointment encounter on 01/25/22    XR PELV 1 OR 2 V    Narrative  AP pelvis digital radiograph obtained in the office today was reviewed and demonstrate mild erosion of femoral acetabular joint space with no evidence of degenerative changes or acute bony abnormality. MRI Results (most recent):  Results from Orders Only encounter on 04/04/22    MRI LUMB SPINE WO CONT    Narrative  MRI LUMBAR SPINE WITHOUT INTRAVENOUS CONTRAST:    CLINICAL HISTORY:  Back pain radiating into the right leg. T1 and T2 weighted images were obtained in the axial and sagittal planes. At the L1-L2 level , narrowing of the disc space is noted. Mild bulging of the  disc is noted. Mild degenerative changes of the facet joints are noted. No  significant central or foraminal stenoses is noted. At the L2-L3 level, no disc herniation or central or foraminal stenoses is noted    At the L3-L4 level, desiccation and narrowing of the disc space is noted. Bulging of the disc and mild foraminal stenoses is noted bilaterally. Mild  degenerative changes of the facet joints are noted. No significant central  stenoses is noted. At the L4-L5 level,  narrowing of the disc space is noted. Broad-based disc  protrusion is noted within indentation of either L5 nerve in the lateral recess  cannot be excluded. Moderate foraminal stenoses is noted with compression of the  right L4 nerve within the right neuroforamen and indentation of the left L4  nerve within the neuroforamen. Changes of laminectomy with good decompression of  the central canal are noted. There are grade I degenerative changes of the  endplates (fibrovascular tissue)  . At the L5-S1 level,  there are changes of laminectomy and posterior fusion with  surgical screws through the L5 and S1 vertebral bodies. Central canal is well  decompressed. Moderate foraminal stenoses is noted bilaterally Grade II  degenerative disease of the endplates are noted (fatty transformation) . The conus medullaris  has a normal MR appearance. No acute or chronic fracture or spondylolisthesis was demonstrated. Impression  Degenerative disc disease L4-L5 with compression of the right L4  nerve within the neuroforamen and indentation of the left L4 nerve within the  neuroforamen. Broad-based disc protrusion and indentation of either L5 nerve in  the lateral recess/subarticular zone cannot be excluded.     Changes of posterior fusion L5-S1 with decompression of the central canal.  Indentation         Allergies   Allergen Reactions    Ancef [Cefazolin] Anaphylaxis    Bee Sting [Sting, Bee] Anaphylaxis    Penicillins Anaphylaxis         Current Outpatient Medications   Medication Sig    HYDROcodone-acetaminophen (NORCO) 5-325 mg per tablet Take 1 Tablet by mouth every four (4) hours as needed for Pain for up to 7 days. Max Daily Amount: 6 Tablets. Indications: pain    gabapentin (NEURONTIN) 400 mg capsule Take 400 mg by mouth three (3) times daily.  methocarbamoL (ROBAXIN) 500 mg tablet take 1 tablet by mouth twice a day if needed for muscle spasm    rivaroxaban (XARELTO) 20 mg tab tablet Take 1 Tab by mouth daily.  folic acid (FOLVITE) 1 mg tablet Take 1 mg by mouth daily. No current facility-administered medications for this visit. Past Medical History:   Diagnosis Date    Chronic pain     Coagulation disorder ([facility] [address]     FACTOR 5     Ill-defined condition     DEPRESSION    Lumbar stenosis     Thromboembolus ([facility] [address] 2014    L LEG AND LUNGS          Past Surgical History:   Procedure Laterality Date    IR INJ FORAMIN EPID LUMB ANES/STER SNGL  1/18/2019         Family History   Problem Relation Age of Onset    Seizures Mother     Cancer Mother         LUNG    Diabetes Father     Seizures Brother     Depression Brother           Social History     Tobacco Use    Smoking status: Current Some Day Smoker    Smokeless tobacco: Never Used   Substance Use Topics    Alcohol use: No    Drug use: Not on file                 ASSESSMENT/PLAN:      1. Lumbar back pain  -     REFERRAL TO SPINE INJECTION  2. Spinal stenosis of lumbar region with neurogenic claudication  -     HYDROcodone-acetaminophen (NORCO) 5-325 mg per tablet; Take 1 Tablet by mouth every four (4) hours as needed for Pain for up to 7 days. Max Daily Amount: 6 Tablets. Indications: pain, Normal, Disp-30 Tablet, R-0  -     REFERRAL TO SPINE INJECTION  3. Lumbar radiculopathy  -     REFERRAL TO SPINE INJECTION  4. Protrusion of lumbar intervertebral disc  -     REFERRAL TO SPINE INJECTION  5.  History of lumbar spinal fusion  -     REFERRAL TO SPINE INJECTION      Below is the assessment and plan developed based on review of pertinent history, physical exam, labs, studies, and medications. Have discussed the diagnosis and radiographic findings at length and answered all patient questions to his questions to their satisfaction. Given the patient's ongoing pain despite conservative management have referred the patient for transforaminal epidural steroid injection. With failure of 3 injections to find lasting pain relief have asked the patient to return to our office for reevaluation. The patient understands and agrees to the treatment plan as outlined above. Return if symptoms worsen or fail to improve. Dr. Keyanna Gilmore was available for immediate consult during this encounter. An electronic signature was used to authenticate this note.     -- Jignesh Espinoza PA-C

## 2023-08-10 ENCOUNTER — HOSPITAL ENCOUNTER (EMERGENCY)
Facility: HOSPITAL | Age: 48
Discharge: HOME | End: 2023-08-10
Payer: COMMERCIAL

## 2023-08-10 VITALS
RESPIRATION RATE: 16 BRPM | HEIGHT: 75 IN | HEART RATE: 59 BPM | SYSTOLIC BLOOD PRESSURE: 114 MMHG | BODY MASS INDEX: 35.19 KG/M2 | TEMPERATURE: 98.1 F | OXYGEN SATURATION: 97 % | WEIGHT: 283 LBS | DIASTOLIC BLOOD PRESSURE: 67 MMHG

## 2023-08-10 DIAGNOSIS — L03.90 CELLULITIS, UNSPECIFIED CELLULITIS SITE: ICD-10-CM

## 2023-08-10 DIAGNOSIS — B88.0 CHIGGER BITES: Primary | ICD-10-CM

## 2023-08-10 PROCEDURE — 99283 EMERGENCY DEPT VISIT LOW MDM: CPT

## 2023-08-10 RX ORDER — DOXYCYCLINE HYCLATE 100 MG/1
100 CAPSULE ORAL 2 TIMES DAILY
Qty: 14 CAPSULE | Refills: 0 | Status: SHIPPED | OUTPATIENT
Start: 2023-08-10 | End: 2023-08-17

## 2023-08-10 ASSESSMENT — LIFESTYLE VARIABLES
HOW MANY STANDARD DRINKS CONTAINING ALCOHOL DO YOU HAVE ON A TYPICAL DAY: PATIENT DOES NOT DRINK
HOW OFTEN DO YOU HAVE A DRINK CONTAINING ALCOHOL: NEVER

## 2023-08-10 ASSESSMENT — PAIN DESCRIPTION - LOCATION: LOCATION: LEG

## 2023-08-10 ASSESSMENT — PAIN DESCRIPTION - ORIENTATION: ORIENTATION: UPPER;LEFT

## 2023-08-10 ASSESSMENT — PAIN - FUNCTIONAL ASSESSMENT: PAIN_FUNCTIONAL_ASSESSMENT: 0-10

## 2023-08-10 ASSESSMENT — PAIN SCALES - GENERAL: PAINLEVEL_OUTOF10: 6

## 2023-08-10 NOTE — ED PROVIDER NOTES
[facility] EMERGENCY DEPT  EMERGENCY DEPARTMENT HISTORY AND PHYSICAL EXAM      Date: 8/10/2023  Patient Name: Moses Hodge  MRN: [id number]  YOB: 1975  Date of evaluation: 8/10/2023  Provider: Nate Sunshine PA-C   Note Started: 3:29 PM EDT 8/10/23    HISTORY OF PRESENT ILLNESS     Chief Complaint   Patient presents with    Insect Bite       History Provided By: Patient    HPI: Moses Level. Kennedy Hodge is a 56 y.o. male past medical history listed below presents emerged from today with complaint of insect bites starting 3 days ago. .  Him and his son both have similar bites and reports that it caused by chiggers. Reports severe itching to the area. No swelling. Discharge from 2 of the lesions yesterday. Comes in today with thinks might be infected. Denies any fever, chills, nausea, vomiting, diarrhea, chest pain, shortness of breath at this time. PAST MEDICAL HISTORY   Past Medical History:  Past Medical History:   Diagnosis Date    Chronic pain     Coagulation disorder ([address])     FACTOR 5     Ill-defined condition     DEPRESSION    Lumbar stenosis     Thromboembolus ([address]) 2014    L LEG AND LUNGS       Past Surgical History:  Past Surgical History:   Procedure Laterality Date    IR LUMBAR TRANSFORAMINAL EPIDURAL SINGLE  1/18/2019       Family History:  Family History   Problem Relation Age of Onset    Seizures Brother     Seizures Mother     Cancer Mother         LUNG    Diabetes Father     Depression Brother        Social History:  Social History     Tobacco Use    Smoking status: Some Days    Smokeless tobacco: Never   Substance Use Topics    Alcohol use: No       Allergies: Allergies   Allergen Reactions    Bee Venom Anaphylaxis    Cefazolin Anaphylaxis    Penicillins Anaphylaxis       PCP: Eligio Lombard, MD    Current Meds:   No current facility-administered medications for this encounter.      Current Outpatient Medications   Medication Sig Dispense Refill    doxycycline hyclate (VIBRAMYCIN) 100 MG

## 2023-08-10 NOTE — ED TRIAGE NOTES
Reports infected insect bites to left inner thigh and outer thigh. Redness and scabbing noted to said areas.

## 2025-03-06 ENCOUNTER — APPOINTMENT (OUTPATIENT)
Facility: HOSPITAL | Age: 50
End: 2025-03-06
Payer: COMMERCIAL

## 2025-03-06 ENCOUNTER — HOSPITAL ENCOUNTER (EMERGENCY)
Facility: HOSPITAL | Age: 50
Discharge: HOME | End: 2025-03-06
Attending: EMERGENCY MEDICINE
Payer: COMMERCIAL

## 2025-03-06 VITALS
DIASTOLIC BLOOD PRESSURE: 74 MMHG | TEMPERATURE: 98.2 F | BODY MASS INDEX: 28.6 KG/M2 | OXYGEN SATURATION: 96 % | HEART RATE: 68 BPM | HEIGHT: 75 IN | WEIGHT: 230 LBS | SYSTOLIC BLOOD PRESSURE: 142 MMHG | RESPIRATION RATE: 15 BRPM

## 2025-03-06 DIAGNOSIS — R07.9 CHEST PAIN, UNSPECIFIED TYPE: Primary | ICD-10-CM

## 2025-03-06 DIAGNOSIS — R11.2 NAUSEA AND VOMITING, UNSPECIFIED VOMITING TYPE: ICD-10-CM

## 2025-03-06 DIAGNOSIS — R10.13 ABDOMINAL PAIN, EPIGASTRIC: ICD-10-CM

## 2025-03-06 LAB
ALBUMIN SERPL-MCNC: 3.8 G/DL (ref 3.5–5)
ALBUMIN/GLOB SERPL: 1.3 (ref 1.1–2.2)
ALP SERPL-CCNC: 74 U/L (ref 45–117)
ALT SERPL-CCNC: 33 U/L (ref 12–78)
ANION GAP SERPL CALC-SCNC: 10 MMOL/L (ref 2–12)
AST SERPL W P-5'-P-CCNC: 23 U/L (ref 15–37)
BASOPHILS # BLD: 0.02 K/UL (ref 0–0.1)
BASOPHILS NFR BLD: 0.2 % (ref 0–1)
BILIRUB SERPL-MCNC: 0.7 MG/DL (ref 0.2–1)
BUN SERPL-MCNC: 12 MG/DL (ref 6–20)
BUN/CREAT SERPL: 11 (ref 12–20)
CA-I BLD-MCNC: 9.1 MG/DL (ref 8.5–10.1)
CHLORIDE SERPL-SCNC: 106 MMOL/L (ref 97–108)
CO2 SERPL-SCNC: 27 MMOL/L (ref 21–32)
CREAT SERPL-MCNC: 1.11 MG/DL (ref 0.7–1.3)
DIFFERENTIAL METHOD BLD: ABNORMAL
EOSINOPHIL # BLD: 0.1 K/UL (ref 0–0.4)
EOSINOPHIL NFR BLD: 0.9 % (ref 0–7)
ERYTHROCYTE [DISTWIDTH] IN BLOOD BY AUTOMATED COUNT: 12.6 % (ref 11.5–14.5)
FLUAV RNA SPEC QL NAA+PROBE: NOT DETECTED
FLUBV RNA SPEC QL NAA+PROBE: NOT DETECTED
GLOBULIN SER CALC-MCNC: 3 G/DL (ref 2–4)
GLUCOSE SERPL-MCNC: 96 MG/DL (ref 65–100)
HCT VFR BLD AUTO: 44.9 % (ref 36.6–50.3)
HGB BLD-MCNC: 15 G/DL (ref 12.1–17)
IMM GRANULOCYTES # BLD AUTO: 0.01 K/UL (ref 0–0.04)
IMM GRANULOCYTES NFR BLD AUTO: 0.1 % (ref 0–0.5)
LYMPHOCYTES # BLD: 1.48 K/UL (ref 0.8–3.5)
LYMPHOCYTES NFR BLD: 13.6 % (ref 12–49)
MCH RBC QN AUTO: 31.8 PG (ref 26–34)
MCHC RBC AUTO-ENTMCNC: 33.4 G/DL (ref 30–36.5)
MCV RBC AUTO: 95.3 FL (ref 80–99)
MONOCYTES # BLD: 0.71 K/UL (ref 0–1)
MONOCYTES NFR BLD: 6.5 % (ref 5–13)
NEUTS SEG # BLD: 8.59 K/UL (ref 1.8–8)
NEUTS SEG NFR BLD: 78.7 % (ref 32–75)
PLATELET # BLD AUTO: 206 K/UL (ref 150–400)
PMV BLD AUTO: 10.2 FL (ref 8.9–12.9)
POTASSIUM SERPL-SCNC: 4 MMOL/L (ref 3.5–5.1)
PROT SERPL-MCNC: 6.8 G/DL (ref 6.4–8.2)
RBC # BLD AUTO: 4.71 M/UL (ref 4.1–5.7)
SARS-COV-2 RNA RESP QL NAA+PROBE: NOT DETECTED
SODIUM SERPL-SCNC: 143 MMOL/L (ref 136–145)
TROPONIN I SERPL HS-MCNC: 5 NG/L (ref 0–76)
TROPONIN I SERPL HS-MCNC: 5 NG/L (ref 0–76)
WBC # BLD AUTO: 10.9 K/UL (ref 4.1–11.1)

## 2025-03-06 PROCEDURE — 2580000003 HC RX 258: Performed by: EMERGENCY MEDICINE

## 2025-03-06 PROCEDURE — 96374 THER/PROPH/DIAG INJ IV PUSH: CPT

## 2025-03-06 PROCEDURE — 2500000003 HC RX 250 WO HCPCS: Performed by: EMERGENCY MEDICINE

## 2025-03-06 PROCEDURE — 6370000000 HC RX 637 (ALT 250 FOR IP): Performed by: EMERGENCY MEDICINE

## 2025-03-06 PROCEDURE — 6360000002 HC RX W HCPCS: Performed by: EMERGENCY MEDICINE

## 2025-03-06 PROCEDURE — 71046 X-RAY EXAM CHEST 2 VIEWS: CPT

## 2025-03-06 PROCEDURE — 84484 ASSAY OF TROPONIN QUANT: CPT

## 2025-03-06 PROCEDURE — 96375 TX/PRO/DX INJ NEW DRUG ADDON: CPT

## 2025-03-06 PROCEDURE — 93005 ELECTROCARDIOGRAM TRACING: CPT | Performed by: EMERGENCY MEDICINE

## 2025-03-06 PROCEDURE — 99285 EMERGENCY DEPT VISIT HI MDM: CPT

## 2025-03-06 PROCEDURE — 80053 COMPREHEN METABOLIC PANEL: CPT

## 2025-03-06 PROCEDURE — 87636 SARSCOV2 & INF A&B AMP PRB: CPT

## 2025-03-06 PROCEDURE — 36415 COLL VENOUS BLD VENIPUNCTURE: CPT

## 2025-03-06 PROCEDURE — 85025 COMPLETE CBC W/AUTO DIFF WBC: CPT

## 2025-03-06 RX ORDER — KETOROLAC TROMETHAMINE 15 MG/ML
15 INJECTION, SOLUTION INTRAMUSCULAR; INTRAVENOUS
Status: COMPLETED | OUTPATIENT
Start: 2025-03-06 | End: 2025-03-06

## 2025-03-06 RX ORDER — KETOROLAC TROMETHAMINE 10 MG/1
10 TABLET, FILM COATED ORAL EVERY 6 HOURS PRN
Qty: 20 TABLET | Refills: 0 | Status: SHIPPED | OUTPATIENT
Start: 2025-03-06

## 2025-03-06 RX ORDER — LIDOCAINE HYDROCHLORIDE 20 MG/ML
15 SOLUTION OROPHARYNGEAL EVERY 4 HOURS PRN
Qty: 250 ML | Refills: 1 | Status: SHIPPED | OUTPATIENT
Start: 2025-03-06

## 2025-03-06 RX ORDER — ONDANSETRON 2 MG/ML
4 INJECTION INTRAMUSCULAR; INTRAVENOUS ONCE
Status: COMPLETED | OUTPATIENT
Start: 2025-03-06 | End: 2025-03-06

## 2025-03-06 RX ORDER — MAGNESIUM HYDROXIDE/ALUMINUM HYDROXICE/SIMETHICONE 120; 1200; 1200 MG/30ML; MG/30ML; MG/30ML
30 SUSPENSION ORAL
Status: COMPLETED | OUTPATIENT
Start: 2025-03-06 | End: 2025-03-06

## 2025-03-06 RX ORDER — ONDANSETRON 4 MG/1
4 TABLET, ORALLY DISINTEGRATING ORAL EVERY 8 HOURS PRN
Qty: 20 TABLET | Refills: 0 | Status: SHIPPED | OUTPATIENT
Start: 2025-03-06

## 2025-03-06 RX ORDER — OMEPRAZOLE 20 MG/1
20 CAPSULE, DELAYED RELEASE ORAL
Qty: 30 CAPSULE | Refills: 0 | Status: SHIPPED | OUTPATIENT
Start: 2025-03-06

## 2025-03-06 RX ORDER — LIDOCAINE HYDROCHLORIDE 20 MG/ML
15 SOLUTION OROPHARYNGEAL
Status: COMPLETED | OUTPATIENT
Start: 2025-03-06 | End: 2025-03-06

## 2025-03-06 RX ADMIN — KETOROLAC TROMETHAMINE 15 MG: 15 INJECTION, SOLUTION INTRAMUSCULAR; INTRAVENOUS at 15:56

## 2025-03-06 RX ADMIN — ONDANSETRON 4 MG: 2 INJECTION, SOLUTION INTRAMUSCULAR; INTRAVENOUS at 15:56

## 2025-03-06 RX ADMIN — LIDOCAINE HYDROCHLORIDE 15 ML: 20 SOLUTION ORAL at 17:04

## 2025-03-06 RX ADMIN — ALUMINUM HYDROXIDE, MAGNESIUM HYDROXIDE, AND SIMETHICONE 30 ML: 200; 200; 20 SUSPENSION ORAL at 17:04

## 2025-03-06 RX ADMIN — SODIUM CHLORIDE, PRESERVATIVE FREE 20 MG: 5 INJECTION INTRAVENOUS at 15:55

## 2025-03-06 ASSESSMENT — HEART SCORE: ECG: NORMAL

## 2025-03-06 ASSESSMENT — PAIN - FUNCTIONAL ASSESSMENT
PAIN_FUNCTIONAL_ASSESSMENT: 0-10
PAIN_FUNCTIONAL_ASSESSMENT: 0-10

## 2025-03-06 ASSESSMENT — PAIN DESCRIPTION - LOCATION
LOCATION: CHEST
LOCATION: CHEST

## 2025-03-06 ASSESSMENT — PAIN SCALES - GENERAL
PAINLEVEL_OUTOF10: 8
PAINLEVEL_OUTOF10: 0
PAINLEVEL_OUTOF10: 7
PAINLEVEL_OUTOF10: 0

## 2025-03-06 ASSESSMENT — PAIN DESCRIPTION - DESCRIPTORS
DESCRIPTORS: SHARP
DESCRIPTORS: SHARP

## 2025-03-06 ASSESSMENT — PAIN DESCRIPTION - ORIENTATION
ORIENTATION: LEFT
ORIENTATION: MID;ANTERIOR

## 2025-03-06 NOTE — ED TRIAGE NOTES
Starrted Friday  Sudden onset of chest pain started in left shoulder sharp took breath left posterior upper elbow up hurts  After that sternal constant chest sharp pain to chest went to back  Heating pad helped,   No trauma or heavy lifting  No Cardiac Hx  Nausea and vomiting x 3 today  No SOB  Never had before   Epigastric pain on palpation

## 2025-03-06 NOTE — ED PROVIDER NOTES
[facility] EMERGENCY DEPT  EMERGENCY DEPARTMENT HISTORY AND PHYSICAL EXAM      Date: 3/6/2025  Patient Name: Bronson Tavarez  MRN: [id number]  Birthdate 1975  Date of evaluation: 3/6/2025  Provider: Moreno Minor MD   Note Started: 3:57 PM EST 3/6/25    HISTORY OF PRESENT ILLNESS     Chief Complaint   Patient presents with    Chest Pain    Shoulder Pain       History Provided By: Patient    HPI: Bronson Tavarez is a 49 y.o. male presents for evaluation of substernal chest pain and left arm pain.  Patient reports the symptoms started last Friday, approximately 6 days ago.  Patient states the pain radiates to his back.  Patient also reports nausea and vomiting with epigastric abdominal pain today.  Denies shortness of breath, denies fevers or chills.    PAST MEDICAL HISTORY   Past Medical History:  Past Medical History:   Diagnosis Date    Chronic pain     Coagulation disorder     FACTOR 5     Ill-defined condition     DEPRESSION    Lumbar stenosis     Thromboembolus ([facility]) 2014    L LEG AND LUNGS       Past Surgical History:  Past Surgical History:   Procedure Laterality Date    IR GUIDED INJ LUMB/SAC TRANSFORAMINAL EPI SINGLE LEVEL  1/18/2019       Family History:  Family History   Problem Relation Age of Onset    Seizures Brother     Seizures Mother     Cancer Mother         LUNG    Diabetes Father     Depression Brother        Social History:  Social History     Tobacco Use    Smoking status: Every Day     Current packs/day: 1.00     Types: Cigarettes    Smokeless tobacco: Never   Substance Use Topics    Alcohol use: No    Drug use: Never       Allergies:  Allergies   Allergen Reactions    Bee Venom Anaphylaxis    Cefazolin Anaphylaxis    Penicillins Anaphylaxis       PCP: Mary Gao MD    Current Meds:   No current facility-administered medications for this encounter.     Current Outpatient Medications   Medication Sig Dispense Refill    ondansetron (ZOFRAN-ODT) 4 MG disintegrating tablet Take 1

## 2025-03-07 LAB
EKG ATRIAL RATE: 55 BPM
EKG DIAGNOSIS: NORMAL
EKG P AXIS: 50 DEGREES
EKG P-R INTERVAL: 134 MS
EKG Q-T INTERVAL: 396 MS
EKG QRS DURATION: 88 MS
EKG QTC CALCULATION (BAZETT): 378 MS
EKG R AXIS: 52 DEGREES
EKG T AXIS: 67 DEGREES
EKG VENTRICULAR RATE: 55 BPM